# Patient Record
Sex: FEMALE
[De-identification: names, ages, dates, MRNs, and addresses within clinical notes are randomized per-mention and may not be internally consistent; named-entity substitution may affect disease eponyms.]

---

## 2023-12-10 ENCOUNTER — NON-APPOINTMENT (OUTPATIENT)
Age: 58
End: 2023-12-10

## 2023-12-11 ENCOUNTER — NON-APPOINTMENT (OUTPATIENT)
Age: 58
End: 2023-12-11

## 2023-12-11 ENCOUNTER — APPOINTMENT (OUTPATIENT)
Dept: FAMILY MEDICINE | Facility: CLINIC | Age: 58
End: 2023-12-11
Payer: COMMERCIAL

## 2023-12-11 VITALS
OXYGEN SATURATION: 95 % | WEIGHT: 180.44 LBS | SYSTOLIC BLOOD PRESSURE: 137 MMHG | BODY MASS INDEX: 31.97 KG/M2 | HEART RATE: 90 BPM | DIASTOLIC BLOOD PRESSURE: 87 MMHG | HEIGHT: 63 IN | RESPIRATION RATE: 16 BRPM

## 2023-12-11 DIAGNOSIS — N39.0 URINARY TRACT INFECTION, SITE NOT SPECIFIED: ICD-10-CM

## 2023-12-11 DIAGNOSIS — Z00.00 ENCOUNTER FOR GENERAL ADULT MEDICAL EXAMINATION W/OUT ABNORMAL FINDINGS: ICD-10-CM

## 2023-12-11 DIAGNOSIS — Z23 ENCOUNTER FOR IMMUNIZATION: ICD-10-CM

## 2023-12-11 DIAGNOSIS — G47.00 INSOMNIA, UNSPECIFIED: ICD-10-CM

## 2023-12-11 PROCEDURE — G0008: CPT

## 2023-12-11 PROCEDURE — 90686 IIV4 VACC NO PRSV 0.5 ML IM: CPT

## 2023-12-11 PROCEDURE — 99204 OFFICE O/P NEW MOD 45 MIN: CPT | Mod: 25

## 2023-12-11 PROCEDURE — 99386 PREV VISIT NEW AGE 40-64: CPT | Mod: 25

## 2023-12-19 ENCOUNTER — APPOINTMENT (OUTPATIENT)
Dept: BARIATRICS/WEIGHT MGMT | Facility: CLINIC | Age: 58
End: 2023-12-19
Payer: SELF-PAY

## 2023-12-19 VITALS
DIASTOLIC BLOOD PRESSURE: 82 MMHG | HEIGHT: 63 IN | SYSTOLIC BLOOD PRESSURE: 141 MMHG | BODY MASS INDEX: 31.8 KG/M2 | HEART RATE: 90 BPM | WEIGHT: 179.5 LBS | RESPIRATION RATE: 16 BRPM | OXYGEN SATURATION: 97 %

## 2023-12-19 PROCEDURE — 97802 MEDICAL NUTRITION INDIV IN: CPT

## 2023-12-20 LAB
ALBUMIN SERPL ELPH-MCNC: 4.4 G/DL
ALP BLD-CCNC: 90 U/L
ALT SERPL-CCNC: 19 U/L
ANION GAP SERPL CALC-SCNC: 14 MMOL/L
AST SERPL-CCNC: 14 U/L
BILIRUB SERPL-MCNC: 0.2 MG/DL
BUN SERPL-MCNC: 15 MG/DL
CALCIUM SERPL-MCNC: 9.5 MG/DL
CHLORIDE SERPL-SCNC: 103 MMOL/L
CHOLEST SERPL-MCNC: 255 MG/DL
CO2 SERPL-SCNC: 24 MMOL/L
CREAT SERPL-MCNC: 0.84 MG/DL
EGFR: 80 ML/MIN/1.73M2
ESTIMATED AVERAGE GLUCOSE: 128 MG/DL
GLUCOSE SERPL-MCNC: 137 MG/DL
HBA1C MFR BLD HPLC: 6.1 %
HCT VFR BLD CALC: 45.3 %
HDLC SERPL-MCNC: 57 MG/DL
HGB BLD-MCNC: 14.5 G/DL
LDLC SERPL CALC-MCNC: 150 MG/DL
MCHC RBC-ENTMCNC: 30.3 PG
MCHC RBC-ENTMCNC: 32 GM/DL
MCV RBC AUTO: 94.8 FL
NONHDLC SERPL-MCNC: 197 MG/DL
PLATELET # BLD AUTO: 371 K/UL
POTASSIUM SERPL-SCNC: 4.2 MMOL/L
PROT SERPL-MCNC: 7.7 G/DL
RBC # BLD: 4.78 M/UL
RBC # FLD: 12.9 %
SODIUM SERPL-SCNC: 141 MMOL/L
TRIGL SERPL-MCNC: 258 MG/DL
TSH SERPL-ACNC: 0.62 UIU/ML
WBC # FLD AUTO: 7 K/UL

## 2024-01-18 ENCOUNTER — APPOINTMENT (OUTPATIENT)
Dept: BARIATRICS/WEIGHT MGMT | Facility: CLINIC | Age: 59
End: 2024-01-18
Payer: COMMERCIAL

## 2024-01-18 VITALS
HEART RATE: 112 BPM | SYSTOLIC BLOOD PRESSURE: 131 MMHG | HEIGHT: 63 IN | RESPIRATION RATE: 16 BRPM | OXYGEN SATURATION: 93 % | BODY MASS INDEX: 31.54 KG/M2 | WEIGHT: 178 LBS | DIASTOLIC BLOOD PRESSURE: 73 MMHG

## 2024-01-18 DIAGNOSIS — Z80.42 FAMILY HISTORY OF MALIGNANT NEOPLASM OF PROSTATE: ICD-10-CM

## 2024-01-18 DIAGNOSIS — Z82.49 FAMILY HISTORY OF ISCHEMIC HEART DISEASE AND OTHER DISEASES OF THE CIRCULATORY SYSTEM: ICD-10-CM

## 2024-01-18 DIAGNOSIS — Z80.7 FAMILY HISTORY OF OTHER MALIGNANT NEOPLASMS OF LYMPHOID, HEMATOPOIETIC AND RELATED TISSUES: ICD-10-CM

## 2024-01-18 PROCEDURE — 99205 OFFICE O/P NEW HI 60 MIN: CPT

## 2024-01-18 RX ORDER — TIRZEPATIDE 2.5 MG/.5ML
2.5 INJECTION, SOLUTION SUBCUTANEOUS
Qty: 1 | Refills: 2 | Status: ACTIVE | COMMUNITY
Start: 2024-01-18 | End: 1900-01-01

## 2024-01-18 RX ORDER — SEMAGLUTIDE 0.25 MG/.5ML
0.25 INJECTION, SOLUTION SUBCUTANEOUS
Qty: 1 | Refills: 1 | Status: ACTIVE | COMMUNITY
Start: 2024-01-18 | End: 1900-01-01

## 2024-01-18 NOTE — ASSESSMENT
[FreeTextEntry1] : 58F PMH class I obesity, prediabetes, elevated triglycerides, HLD, who presents to weight management for initial evaluation.  # Metabolic Syndrome (class I obesity - central, prediabetes, elevated trig) c/b HLD: Weight today 178 lbs, BMI 31.53, estimates ~25-30 lb weight gain since the start of the pandemic in the setting of switch to working remote and much more sedentary lifestyle, struggles with night eating. Has seen dietician and made some modifications, particularly increasing vegetables and getting more protein. High suspicion for CHELSIE.  - Food log - Meal planning/prep - Aim to limit intake after dinner, make sure adequate intake earlier.  - Build physical activity into routine, Pelaton ordered.  - Defers sleep eval for now, will first address possible CHELSIE w/weight loss - Discussed medical interventions at length, incretin therap prescribed - F/u in 1 month   Z/W/Sa --> Joann / Manuel

## 2024-01-18 NOTE — HISTORY OF PRESENT ILLNESS
[FreeTextEntry1] : 58F PMH class I obesity, prediabetes, elevated triglycerides, HLD, who presents to weight management for initial evaluation.  Weight/Diet History: Estimates pre-pandemic weight of ~150 lbs, gained in the setting of more sedentary lifestyle starting with the pandemic, working as a therapist switching to entirely telehealth and busy during the day, always sitting. Used to go to the track and run, hasn't done any exercise and not moving much during the day. Also notes overeating at night, possibly not eating enough during business of the day. Currently at her heaviest weight. Has met with dietician, no other specific interventions.   Weight today: 178 lbs, BMI 31.53  Diet: Has seen sharri Collins, 12/19/23 Changed breakfast, more conscious of what she's eating in the morning. B (8-11 am): Oatmeal, banana L (2-3 pm): quesadilla D (5:30-6:30): Spaghetti/meatball, proteins and veggies (incorporated more vegetables, and protein content). Snack: Just night Beverages: Coffee w/chobani extra creamer or half and half or lactose-free milk.Occasional soda. Wine 2-3 glasses of wine. Night-time eating: Often finds herself overeating at night, yogurt/granola/berries, cookies Fast-food/Restaurants: Pizza/Chinese 1x/wk Food Journal: no  Exercise: Sedentary, minimal, but just ordered a pelaton. Used to go to the track pre pandemic.  Sleep: "awful" for the past 19 years. Goes to bed before midnight, up by 6 am. Estimates 5 hours, bathroom once per night. Snores,  and kids have noted and has worsened with weight gain.  Social Hx: No tobacco. Estimates 2-3 glasses of wine per month. Lives with  and has 2 sons. Works as a therapist, remote.  No FHx thyroid ca.

## 2024-05-09 PROBLEM — E66.9 CLASS 1 OBESITY: Status: ACTIVE | Noted: 2023-12-14

## 2024-05-09 PROBLEM — E78.5 HYPERLIPIDEMIA, UNSPECIFIED HYPERLIPIDEMIA TYPE: Status: ACTIVE | Noted: 2024-01-18

## 2024-05-09 PROBLEM — R73.03 PREDIABETES: Status: ACTIVE | Noted: 2024-01-18

## 2024-05-09 PROBLEM — E78.2 ELEVATED TRIGLYCERIDES WITH HIGH CHOLESTEROL: Status: ACTIVE | Noted: 2024-01-18

## 2024-05-09 PROBLEM — E88.810 METABOLIC SYNDROME: Status: ACTIVE | Noted: 2024-01-18

## 2024-05-10 ENCOUNTER — APPOINTMENT (OUTPATIENT)
Dept: BARIATRICS/WEIGHT MGMT | Facility: CLINIC | Age: 59
End: 2024-05-10
Payer: COMMERCIAL

## 2024-05-10 VITALS — HEIGHT: 63 IN | WEIGHT: 182 LBS | BODY MASS INDEX: 32.25 KG/M2

## 2024-05-10 DIAGNOSIS — E66.9 OBESITY, UNSPECIFIED: ICD-10-CM

## 2024-05-10 DIAGNOSIS — R73.03 PREDIABETES.: ICD-10-CM

## 2024-05-10 DIAGNOSIS — E78.5 HYPERLIPIDEMIA, UNSPECIFIED: ICD-10-CM

## 2024-05-10 DIAGNOSIS — E78.2 MIXED HYPERLIPIDEMIA: ICD-10-CM

## 2024-05-10 DIAGNOSIS — E88.810 METABOLIC SYNDROME: ICD-10-CM

## 2024-05-10 PROCEDURE — 99214 OFFICE O/P EST MOD 30 MIN: CPT

## 2024-05-10 PROCEDURE — G2211 COMPLEX E/M VISIT ADD ON: CPT | Mod: NC,1L

## 2024-05-10 RX ORDER — SEMAGLUTIDE 0.5 MG/.5ML
0.5 INJECTION, SOLUTION SUBCUTANEOUS
Qty: 1 | Refills: 1 | Status: ACTIVE | COMMUNITY
Start: 2024-05-10 | End: 1900-01-01

## 2024-05-10 RX ORDER — LIRAGLUTIDE 6 MG/ML
18 INJECTION, SOLUTION SUBCUTANEOUS
Qty: 1 | Refills: 2 | Status: DISCONTINUED | COMMUNITY
Start: 2024-01-18 | End: 2024-05-10

## 2024-05-10 NOTE — HISTORY OF PRESENT ILLNESS
[FreeTextEntry1] : 58F PMH class I obesity, prediabetes, elevated triglycerides, HLD, who presents to weight management for follow-up.  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 She noted struggles with night eating. Has seen dietician and made some modifications, particularly increasing vegetables and getting more protein. High suspicion for CHELSIE.  We discussed lifestyle and dietary interventions. She was prescribed ...  Weight today: 182 lbs (home), BMI 32.24 Weight at Initial Visit (1/18/24): 178 lbs, BMI 31.53  Medication: She started Zepbound 2.5 mg/wk, is taking her 4th dose. Is eating less but doesn't feel she's lost weight  Diet: Eating less Has seen Karina dietician, 12/19/23  Exercise: 20-30 min 3x/wk on treadmill.  Sleep: has actually been better. First visit: "awful" for the past 19 years. Goes to bed before midnight, up by 6 am. Estimates 5 hours, bathroom once per night. Snores,  and kids have noted and has worsened with weight gain.  Social Hx: No tobacco. Estimates 2-3 glasses of wine per month. Lives with  and has 2 sons. Works as a therapist, remote.

## 2024-05-10 NOTE — PHYSICAL EXAM
[Obese, well nourished, in no acute distress] : obese, well nourished, in no acute distress [de-identified] : TEB visit

## 2024-05-26 NOTE — ASSESSMENT
Assessment:  Patient reports having 2 falls today the one in which was observed by her daughter where she fell face flat  Denies syncope but endorses more frequent palpitations than her baseline  Feels that she is having more blurry vision as of lately, is seeing ophthalmologist outpatient. Has bilateral cataract extraction in past.  Complains of 2/10 lumbar spine pain with coughing, but 1/10 at rest  Sodium 128 but chronically low  Hgb 11.4  POCT BG showed pH 7.407, pCO2 36.8, pO2 23.0, HCO3 23.2, Sodium 130  TTE this admission shows worsening mitral regurg.  LVEF 55% with normal systolic function.  Elevated right ventricular pressure.  Trauma workup was unremarkable: CT head demonstrates a hyperdensity in the left frontotemporal region.  After speaking with patient and chart review, this hyperdensity was present on a CT in 2022 and patient was diagnosed with a hemangioma at that time. CT C-spine negative for acute traumatic injuries. FAST exam negative      Plan:  Monitor telemetry  PT/OT   Pain control with tylenol for mild pain and oxycodoe for moderate pain, Lidocaine patch as needed   [FreeTextEntry1] : 58F PMH class I obesity, prediabetes, elevated triglycerides, HLD, who presents to weight management for follow-up.  # Metabolic Syndrome (class I obesity - central, prediabetes, elevated trig) c/b HLD: Weight today 182 lbsWeight similar, slight gain (different scale) since last visit 4 months ago. She just started Zepbound this month, is eating less but doesn't feel she's lost weight yet. Saw dietician. Increased walking. Sleep improved.  - Food log - Meal planning/prep - Aim to limit intake after dinner, make sure adequate intake earlier.  - Build physical activity into routine, Pelaton ordered. We discussed adding resistance exercise.  - Defers sleep eval for now, will first address possible CHELSIE w/weight loss - Switch to Wegovy 0.5 mg/wk given shortages.  - F/u in 1 month

## 2024-07-10 ENCOUNTER — APPOINTMENT (OUTPATIENT)
Dept: BARIATRICS/WEIGHT MGMT | Facility: CLINIC | Age: 59
End: 2024-07-10
Payer: COMMERCIAL

## 2024-07-10 VITALS — HEIGHT: 63 IN | WEIGHT: 167 LBS | BODY MASS INDEX: 29.59 KG/M2

## 2024-07-10 DIAGNOSIS — E66.9 OBESITY, UNSPECIFIED: ICD-10-CM

## 2024-07-10 DIAGNOSIS — E78.5 HYPERLIPIDEMIA, UNSPECIFIED: ICD-10-CM

## 2024-07-10 DIAGNOSIS — E78.2 MIXED HYPERLIPIDEMIA: ICD-10-CM

## 2024-07-10 DIAGNOSIS — R73.03 PREDIABETES.: ICD-10-CM

## 2024-07-10 DIAGNOSIS — E88.810 METABOLIC SYNDROME: ICD-10-CM

## 2024-07-10 PROCEDURE — G2211 COMPLEX E/M VISIT ADD ON: CPT | Mod: NC

## 2024-07-10 PROCEDURE — 99214 OFFICE O/P EST MOD 30 MIN: CPT

## 2024-09-04 ENCOUNTER — APPOINTMENT (OUTPATIENT)
Dept: BARIATRICS/WEIGHT MGMT | Facility: CLINIC | Age: 59
End: 2024-09-04
Payer: COMMERCIAL

## 2024-09-04 VITALS — HEIGHT: 63 IN | BODY MASS INDEX: 27.68 KG/M2 | WEIGHT: 156.2 LBS

## 2024-09-04 DIAGNOSIS — R73.03 PREDIABETES.: ICD-10-CM

## 2024-09-04 DIAGNOSIS — E78.2 MIXED HYPERLIPIDEMIA: ICD-10-CM

## 2024-09-04 DIAGNOSIS — E66.9 OBESITY, UNSPECIFIED: ICD-10-CM

## 2024-09-04 DIAGNOSIS — E78.5 HYPERLIPIDEMIA, UNSPECIFIED: ICD-10-CM

## 2024-09-04 DIAGNOSIS — E88.810 METABOLIC SYNDROME: ICD-10-CM

## 2024-09-04 PROCEDURE — G2211 COMPLEX E/M VISIT ADD ON: CPT | Mod: NC

## 2024-09-04 PROCEDURE — 99214 OFFICE O/P EST MOD 30 MIN: CPT

## 2024-09-04 NOTE — ASSESSMENT
[FreeTextEntry1] : 59F PMH class I obesity, prediabetes, elevated triglycerides, HLD, who presents to weight management for follow-up.  # Metabolic Syndrome (class I obesity - central, prediabetes, elevated trig) c/b HLD: Weight today 156.2 lbs, BMI 27.67, down 11 lbs since last visit ~8 weeks ago, and ~26 lbs since starting Wegovy, is doing well on 1 mg/wk w/ no adverse effects. Has made dietary improvements. Is keeping up with walking regularly, slight start to exercise.  - Food log - Meal planning/prep - Aim to limit intake after dinner, make sure adequate intake earlier.  - Build physical activity into routine, Pelaton ordered. We discussed adding resistance exercise.  - Defers sleep eval for now, will first address possible CHELSIE w/weight loss - Increase Wegovy to 1.7 mg/wk, will likely c/w this dose for >1 month given the effect she has been having.  - F/u in 2-3 months

## 2024-09-04 NOTE — PHYSICAL EXAM
[Obese, well nourished, in no acute distress] : obese, well nourished, in no acute distress [de-identified] : TEB visit

## 2024-09-04 NOTE — HISTORY OF PRESENT ILLNESS
[Home] : at home, [unfilled] , at the time of the visit. [Medical Office: ([facility])___] : at the medical office located in  [Verbal consent obtained from patient] : the patient, [unfilled] [FreeTextEntry1] : 59F PMH class I obesity, prediabetes, elevated triglycerides, HLD, who presents to weight management for follow-up.  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 She noted struggles with night eating. Has seen dietician and made some modifications, particularly increasing vegetables and getting more protein. High suspicion for CHELSIE.  We discussed lifestyle and dietary interventions. She was prescribed Zepbound, we switched to Wegovy in 5/2024 from which she noted a stronger effect.   Weight today: 156.2 lbs, BMI 27.67 Weight at last visit (7/10/24): 167 lbs, BMI 29.58 Weight (5/10/24): 182 lbs (home), BMI 32.24 Weight at Initial Visit (1/18/24): 178 lbs, BMI 31.53  Medication: She is taking Wegovy 1 mg/wk, no side effects, ~8 weeks.  (She switched to Wegovy 0.5 mg/wk (after 2 months of 2.5 Zepbound), notes much stronger effect. Has completed 4 weeks. No side effects at all, not eating as much.   Diet: Significantly less intake. Estimates 2 meals. Followed with dietician. Not eating junk food anymore  Exercise: 20-30 min 3x/wk on treadmill. Clothes are fitting better. Started doing a little bit of resistance exercise.   Sleep:  (has actually been better. First visit: "awful" for the past 19 years. Goes to bed before midnight, up by 6 am. Estimates 5 hours, bathroom once per night. Snores,  and kids have noted and has worsened with weight gain.

## 2024-11-21 ENCOUNTER — RX RENEWAL (OUTPATIENT)
Age: 59
End: 2024-11-21

## 2025-02-19 PROBLEM — E66.811 CLASS 1 OBESITY: Status: ACTIVE | Noted: 2023-12-14

## 2025-02-20 ENCOUNTER — NON-APPOINTMENT (OUTPATIENT)
Age: 60
End: 2025-02-20

## 2025-02-20 ENCOUNTER — APPOINTMENT (OUTPATIENT)
Dept: BARIATRICS/WEIGHT MGMT | Facility: CLINIC | Age: 60
End: 2025-02-20
Payer: COMMERCIAL

## 2025-02-20 VITALS
BODY MASS INDEX: 22.86 KG/M2 | DIASTOLIC BLOOD PRESSURE: 72 MMHG | WEIGHT: 129 LBS | SYSTOLIC BLOOD PRESSURE: 108 MMHG | HEIGHT: 63 IN | OXYGEN SATURATION: 98 % | HEART RATE: 64 BPM

## 2025-02-20 DIAGNOSIS — E88.810 METABOLIC SYNDROME: ICD-10-CM

## 2025-02-20 DIAGNOSIS — Z00.00 ENCOUNTER FOR GENERAL ADULT MEDICAL EXAMINATION W/OUT ABNORMAL FINDINGS: ICD-10-CM

## 2025-02-20 DIAGNOSIS — E66.811 OBESITY, CLASS 1: ICD-10-CM

## 2025-02-20 DIAGNOSIS — R73.03 PREDIABETES.: ICD-10-CM

## 2025-02-20 DIAGNOSIS — E78.2 MIXED HYPERLIPIDEMIA: ICD-10-CM

## 2025-02-20 DIAGNOSIS — E78.5 HYPERLIPIDEMIA, UNSPECIFIED: ICD-10-CM

## 2025-02-20 PROCEDURE — G2211 COMPLEX E/M VISIT ADD ON: CPT | Mod: NC

## 2025-02-20 PROCEDURE — 99214 OFFICE O/P EST MOD 30 MIN: CPT

## 2025-02-21 LAB
25(OH)D3 SERPL-MCNC: 42.2 NG/ML
ALBUMIN SERPL ELPH-MCNC: 4.4 G/DL
ALP BLD-CCNC: 71 U/L
ALT SERPL-CCNC: 14 U/L
ANION GAP SERPL CALC-SCNC: 16 MMOL/L
AST SERPL-CCNC: 18 U/L
BILIRUB SERPL-MCNC: 0.3 MG/DL
BUN SERPL-MCNC: 14 MG/DL
CALCIUM SERPL-MCNC: 9.8 MG/DL
CHLORIDE SERPL-SCNC: 101 MMOL/L
CHOLEST SERPL-MCNC: 241 MG/DL
CO2 SERPL-SCNC: 24 MMOL/L
CREAT SERPL-MCNC: 0.83 MG/DL
EGFR: 81 ML/MIN/1.73M2
ESTIMATED AVERAGE GLUCOSE: 105 MG/DL
GLUCOSE SERPL-MCNC: 95 MG/DL
HBA1C MFR BLD HPLC: 5.3 %
HCT VFR BLD CALC: 41.8 %
HDLC SERPL-MCNC: 65 MG/DL
HGB BLD-MCNC: 14 G/DL
LDLC SERPL CALC-MCNC: 154 MG/DL
MCHC RBC-ENTMCNC: 31.9 PG
MCHC RBC-ENTMCNC: 33.5 G/DL
MCV RBC AUTO: 95.2 FL
NONHDLC SERPL-MCNC: 176 MG/DL
PLATELET # BLD AUTO: 337 K/UL
POTASSIUM SERPL-SCNC: 4 MMOL/L
PROT SERPL-MCNC: 7.3 G/DL
RBC # BLD: 4.39 M/UL
RBC # FLD: 12.4 %
SODIUM SERPL-SCNC: 141 MMOL/L
TRIGL SERPL-MCNC: 126 MG/DL
WBC # FLD AUTO: 5.72 K/UL

## 2025-05-29 ENCOUNTER — NON-APPOINTMENT (OUTPATIENT)
Age: 60
End: 2025-05-29

## 2025-05-30 ENCOUNTER — APPOINTMENT (OUTPATIENT)
Dept: BARIATRICS/WEIGHT MGMT | Facility: CLINIC | Age: 60
End: 2025-05-30

## 2025-05-30 VITALS — HEIGHT: 63 IN | WEIGHT: 118 LBS | BODY MASS INDEX: 20.91 KG/M2

## 2025-05-30 DIAGNOSIS — E78.2 MIXED HYPERLIPIDEMIA: ICD-10-CM

## 2025-05-30 DIAGNOSIS — E88.810 METABOLIC SYNDROME: ICD-10-CM

## 2025-05-30 DIAGNOSIS — E78.5 HYPERLIPIDEMIA, UNSPECIFIED: ICD-10-CM

## 2025-05-30 DIAGNOSIS — R73.03 PREDIABETES.: ICD-10-CM

## 2025-05-30 DIAGNOSIS — E66.811 OBESITY, CLASS 1: ICD-10-CM

## 2025-05-30 PROCEDURE — 99214 OFFICE O/P EST MOD 30 MIN: CPT | Mod: 95

## 2025-05-30 PROCEDURE — G2211 COMPLEX E/M VISIT ADD ON: CPT | Mod: NC,95

## 2025-06-05 ENCOUNTER — APPOINTMENT (OUTPATIENT)
Dept: FAMILY MEDICINE | Facility: CLINIC | Age: 60
End: 2025-06-05
Payer: COMMERCIAL

## 2025-06-05 ENCOUNTER — NON-APPOINTMENT (OUTPATIENT)
Age: 60
End: 2025-06-05

## 2025-06-05 VITALS
WEIGHT: 119 LBS | OXYGEN SATURATION: 100 % | BODY MASS INDEX: 21.09 KG/M2 | DIASTOLIC BLOOD PRESSURE: 79 MMHG | SYSTOLIC BLOOD PRESSURE: 118 MMHG | HEART RATE: 77 BPM | HEIGHT: 63 IN | RESPIRATION RATE: 16 BRPM

## 2025-06-05 DIAGNOSIS — Z23 ENCOUNTER FOR IMMUNIZATION: ICD-10-CM

## 2025-06-05 DIAGNOSIS — G47.00 INSOMNIA, UNSPECIFIED: ICD-10-CM

## 2025-06-05 DIAGNOSIS — E55.9 VITAMIN D DEFICIENCY, UNSPECIFIED: ICD-10-CM

## 2025-06-05 DIAGNOSIS — Z00.00 ENCOUNTER FOR GENERAL ADULT MEDICAL EXAMINATION W/OUT ABNORMAL FINDINGS: ICD-10-CM

## 2025-06-05 LAB
HCT VFR BLD CALC: 43.6 %
HGB BLD-MCNC: 13.7 G/DL
MCHC RBC-ENTMCNC: 31.1 PG
MCHC RBC-ENTMCNC: 31.4 G/DL
MCV RBC AUTO: 98.9 FL
PLATELET # BLD AUTO: 281 K/UL
RBC # BLD: 4.41 M/UL
RBC # FLD: 12.4 %
WBC # FLD AUTO: 5.29 K/UL

## 2025-06-05 PROCEDURE — 99396 PREV VISIT EST AGE 40-64: CPT

## 2025-06-06 LAB
25(OH)D3 SERPL-MCNC: 67 NG/ML
ALBUMIN SERPL ELPH-MCNC: 4.3 G/DL
ALP BLD-CCNC: 64 U/L
ALT SERPL-CCNC: 15 U/L
ANION GAP SERPL CALC-SCNC: 14 MMOL/L
AST SERPL-CCNC: 15 U/L
BILIRUB SERPL-MCNC: 0.4 MG/DL
BUN SERPL-MCNC: 18 MG/DL
CALCIUM SERPL-MCNC: 9.6 MG/DL
CHLORIDE SERPL-SCNC: 103 MMOL/L
CHOLEST SERPL-MCNC: 223 MG/DL
CO2 SERPL-SCNC: 23 MMOL/L
CREAT SERPL-MCNC: 0.96 MG/DL
EGFRCR SERPLBLD CKD-EPI 2021: 68 ML/MIN/1.73M2
ESTIMATED AVERAGE GLUCOSE: 100 MG/DL
GLUCOSE SERPL-MCNC: 83 MG/DL
HBA1C MFR BLD HPLC: 5.1 %
HDLC SERPL-MCNC: 70 MG/DL
LDLC SERPL-MCNC: 138 MG/DL
NONHDLC SERPL-MCNC: 153 MG/DL
POTASSIUM SERPL-SCNC: 4.6 MMOL/L
PROT SERPL-MCNC: 6.8 G/DL
SODIUM SERPL-SCNC: 139 MMOL/L
TRIGL SERPL-MCNC: 89 MG/DL
TSH SERPL-ACNC: 0.91 UIU/ML

## 2025-08-22 ENCOUNTER — APPOINTMENT (OUTPATIENT)
Dept: BARIATRICS/WEIGHT MGMT | Facility: CLINIC | Age: 60
End: 2025-08-22

## 2025-08-22 DIAGNOSIS — E78.5 HYPERLIPIDEMIA, UNSPECIFIED: ICD-10-CM

## 2025-08-22 DIAGNOSIS — R73.03 PREDIABETES.: ICD-10-CM

## 2025-08-22 DIAGNOSIS — E78.2 MIXED HYPERLIPIDEMIA: ICD-10-CM

## 2025-08-22 DIAGNOSIS — E88.810 METABOLIC SYNDROME: ICD-10-CM

## 2025-08-22 DIAGNOSIS — E66.811 OBESITY, CLASS 1: ICD-10-CM

## 2025-09-10 RX ORDER — ONDANSETRON 4 MG/1
4 TABLET ORAL
Qty: 30 | Refills: 1 | Status: ACTIVE | COMMUNITY
Start: 2025-09-10 | End: 1900-01-01